# Patient Record
Sex: FEMALE | Race: BLACK OR AFRICAN AMERICAN | Employment: FULL TIME | URBAN - METROPOLITAN AREA
[De-identification: names, ages, dates, MRNs, and addresses within clinical notes are randomized per-mention and may not be internally consistent; named-entity substitution may affect disease eponyms.]

---

## 2019-04-30 ENCOUNTER — ANESTHESIA EVENT (OUTPATIENT)
Dept: SURGERY | Age: 59
End: 2019-04-30
Payer: COMMERCIAL

## 2019-04-30 RX ORDER — LOSARTAN POTASSIUM AND HYDROCHLOROTHIAZIDE 12.5; 1 MG/1; MG/1
1 TABLET ORAL DAILY
COMMUNITY

## 2019-04-30 RX ORDER — OXYMETAZOLINE HCL 0.05 %
2 SPRAY, NON-AEROSOL (ML) NASAL
COMMUNITY

## 2019-04-30 RX ORDER — LORATADINE 10 MG/1
10 TABLET ORAL
COMMUNITY

## 2019-04-30 NOTE — PERIOP NOTES
PAT - SURGICAL PRE-ADMISSION INSTRUCTIONS 
 
NAME:  Izabel Alvarado                                                          TODAY'S DATE:  4/30/2019 SURGERY DATE:  5/1/2019                                  SURGERY ARRIVAL TIME:   0800 1. Do NOT eat or drink anything, including candy or gum, after MIDNIGHT on 04/30 , unless you have specific instructions from your Surgeon or Anesthesia Provider to do so. 2. No smoking on the day of surgery. 3. No alcohol 24 hours prior to the day of surgery. 4. No recreational drugs for one week prior to the day of surgery. 5. Leave all valuables, including money/purse, at home. 6. Remove all jewelry, nail polish, makeup (including mascara); no lotions, powders, deodorant, or perfume/cologne/after shave. 7. Glasses/Contact lenses and Dentures may be worn to the hospital.  They will be removed prior to surgery. 8. Call your doctor if symptoms of a cold or illness develop within 24 ours prior to surgery. 9. AN ADULT MUST DRIVE YOU HOME AFTER OUTPATIENT SURGERY. 10. If you are having an OUTPATIENT procedure, please make arrangements for a responsible adult to be with you for 24 hours after your surgery. 11. If you are admitted to the hospital, you will be assigned to a bed after surgery is complete. Normally a family member will not be able to see you until you are in your assigned bed. 15. Family is encouraged to accompany you to the hospital.  We ask visitors in the treatment area to be limited to ONE person at a time to ensure patient privacy. EXCEPTIONS WILL BE MADE AS NEEDED. [address] [facility] [place]. Children under 12 are discouraged from entering the treatment area and need to be supervised by an adult when in the waiting room. Special Instructions: 
 
NONE. Patient Prep: 
 
shower with anti-bacterial soap These surgical instructions were reviewed with Janny during the PAT phone call. Directions:   On the morning of surgery, please go to the [facility]. Enter the building from the [facility] entrance, 1st floor (next to the Emergency Room entrance). Take the elevator to the 2nd floor. Sign in at the Registration Desk. If you have any questions and/or concerns, please do not hesitate to call: 
(Prior to the day of surgery)  \[facility]\"" unit:  [phone] (Day of surgery)   unit:  [phone]

## 2019-05-01 ENCOUNTER — HOSPITAL ENCOUNTER (OUTPATIENT)
Age: 59
Setting detail: OUTPATIENT SURGERY
Discharge: HOME | End: 2019-05-01
Attending: PLASTIC SURGERY | Admitting: PLASTIC SURGERY
Payer: COMMERCIAL

## 2019-05-01 ENCOUNTER — ANESTHESIA (OUTPATIENT)
Dept: SURGERY | Age: 59
End: 2019-05-01
Payer: COMMERCIAL

## 2019-05-01 VITALS
RESPIRATION RATE: 16 BRPM | TEMPERATURE: 97.6 F | DIASTOLIC BLOOD PRESSURE: 85 MMHG | OXYGEN SATURATION: 98 % | BODY MASS INDEX: 36.82 KG/M2 | WEIGHT: 195 LBS | SYSTOLIC BLOOD PRESSURE: 148 MMHG | HEIGHT: 61 IN | HEART RATE: 72 BPM

## 2019-05-01 DIAGNOSIS — Z98.890 POST-OPERATIVE STATE: ICD-10-CM

## 2019-05-01 DIAGNOSIS — G56.01 RIGHT CARPAL TUNNEL SYNDROME: Primary | ICD-10-CM

## 2019-05-01 LAB
ATRIAL RATE: 67 BPM
CALCULATED P AXIS, ECG09: -13 DEGREES
CALCULATED R AXIS, ECG10: 42 DEGREES
CALCULATED T AXIS, ECG11: -15 DEGREES
DIAGNOSIS, 93000: NORMAL
P-R INTERVAL, ECG05: 142 MS
Q-T INTERVAL, ECG07: 392 MS
QRS DURATION, ECG06: 78 MS
QTC CALCULATION (BEZET), ECG08: 414 MS
VENTRICULAR RATE, ECG03: 67 BPM

## 2019-05-01 PROCEDURE — 77030018836 HC SOL IRR NACL ICUM -A: Performed by: PLASTIC SURGERY

## 2019-05-01 PROCEDURE — 74011000250 HC RX REV CODE- 250: Performed by: PLASTIC SURGERY

## 2019-05-01 PROCEDURE — 74011250636 HC RX REV CODE- 250/636

## 2019-05-01 PROCEDURE — 77030020753 HC CUF TRNQT 1BLA STRY -B: Performed by: PLASTIC SURGERY

## 2019-05-01 PROCEDURE — 74011250637 HC RX REV CODE- 250/637: Performed by: NURSE ANESTHETIST, CERTIFIED REGISTERED

## 2019-05-01 PROCEDURE — 76010000138 HC OR TIME 0.5 TO 1 HR: Performed by: PLASTIC SURGERY

## 2019-05-01 PROCEDURE — 77030002986 HC SUT PROL J&J -A: Performed by: PLASTIC SURGERY

## 2019-05-01 PROCEDURE — 93005 ELECTROCARDIOGRAM TRACING: CPT

## 2019-05-01 PROCEDURE — 76210000020 HC REC RM PH II FIRST 0.5 HR: Performed by: PLASTIC SURGERY

## 2019-05-01 PROCEDURE — 77030013079 HC BLNKT BAIR HGGR 3M -A: Performed by: ANESTHESIOLOGY

## 2019-05-01 PROCEDURE — 74011250636 HC RX REV CODE- 250/636: Performed by: PLASTIC SURGERY

## 2019-05-01 PROCEDURE — 77030032490 HC SLV COMPR SCD KNE COVD -B: Performed by: PLASTIC SURGERY

## 2019-05-01 PROCEDURE — 74011250636 HC RX REV CODE- 250/636: Performed by: NURSE ANESTHETIST, CERTIFIED REGISTERED

## 2019-05-01 PROCEDURE — 76060000032 HC ANESTHESIA 0.5 TO 1 HR: Performed by: PLASTIC SURGERY

## 2019-05-01 PROCEDURE — 74011000272 HC RX REV CODE- 272: Performed by: PLASTIC SURGERY

## 2019-05-01 RX ORDER — LIDOCAINE HYDROCHLORIDE 10 MG/ML
0.1 INJECTION, SOLUTION EPIDURAL; INFILTRATION; INTRACAUDAL; PERINEURAL AS NEEDED
Status: DISCONTINUED | OUTPATIENT
Start: 2019-05-01 | End: 2019-05-01 | Stop reason: HOSPADM

## 2019-05-01 RX ORDER — INSULIN LISPRO 100 [IU]/ML
INJECTION, SOLUTION INTRAVENOUS; SUBCUTANEOUS ONCE
Status: CANCELLED | OUTPATIENT
Start: 2019-05-01 | End: 2019-05-01

## 2019-05-01 RX ORDER — MIDAZOLAM HYDROCHLORIDE 1 MG/ML
INJECTION, SOLUTION INTRAMUSCULAR; INTRAVENOUS AS NEEDED
Status: DISCONTINUED | OUTPATIENT
Start: 2019-05-01 | End: 2019-05-01 | Stop reason: HOSPADM

## 2019-05-01 RX ORDER — PROPOFOL 10 MG/ML
INJECTION, EMULSION INTRAVENOUS AS NEEDED
Status: DISCONTINUED | OUTPATIENT
Start: 2019-05-01 | End: 2019-05-01 | Stop reason: HOSPADM

## 2019-05-01 RX ORDER — SODIUM CHLORIDE, SODIUM LACTATE, POTASSIUM CHLORIDE, CALCIUM CHLORIDE 600; 310; 30; 20 MG/100ML; MG/100ML; MG/100ML; MG/100ML
50 INJECTION, SOLUTION INTRAVENOUS CONTINUOUS
Status: DISCONTINUED | OUTPATIENT
Start: 2019-05-01 | End: 2019-05-01 | Stop reason: HOSPADM

## 2019-05-01 RX ORDER — HYDROCODONE BITARTRATE AND ACETAMINOPHEN 5; 325 MG/1; MG/1
1 TABLET ORAL
Qty: 5 TAB | Refills: 0 | Status: SHIPPED | OUTPATIENT
Start: 2019-05-01 | End: 2019-05-08

## 2019-05-01 RX ORDER — IBUPROFEN 600 MG/1
TABLET ORAL
Qty: 20 TAB | Refills: 0 | Status: SHIPPED | OUTPATIENT
Start: 2019-05-01

## 2019-05-01 RX ORDER — MAGNESIUM SULFATE 100 %
4 CRYSTALS MISCELLANEOUS AS NEEDED
Status: CANCELLED | OUTPATIENT
Start: 2019-05-01

## 2019-05-01 RX ORDER — PROPOFOL 10 MG/ML
INJECTION, EMULSION INTRAVENOUS
Status: DISCONTINUED | OUTPATIENT
Start: 2019-05-01 | End: 2019-05-01 | Stop reason: HOSPADM

## 2019-05-01 RX ORDER — FENTANYL CITRATE 50 UG/ML
INJECTION, SOLUTION INTRAMUSCULAR; INTRAVENOUS AS NEEDED
Status: DISCONTINUED | OUTPATIENT
Start: 2019-05-01 | End: 2019-05-01 | Stop reason: HOSPADM

## 2019-05-01 RX ORDER — DIPHENHYDRAMINE HYDROCHLORIDE 50 MG/ML
12.5 INJECTION, SOLUTION INTRAMUSCULAR; INTRAVENOUS
Status: CANCELLED | OUTPATIENT
Start: 2019-05-01

## 2019-05-01 RX ORDER — SODIUM CHLORIDE 0.9 % (FLUSH) 0.9 %
5-40 SYRINGE (ML) INJECTION AS NEEDED
Status: CANCELLED | OUTPATIENT
Start: 2019-05-01

## 2019-05-01 RX ORDER — SODIUM CHLORIDE, SODIUM LACTATE, POTASSIUM CHLORIDE, CALCIUM CHLORIDE 600; 310; 30; 20 MG/100ML; MG/100ML; MG/100ML; MG/100ML
100 INJECTION, SOLUTION INTRAVENOUS CONTINUOUS
Status: CANCELLED | OUTPATIENT
Start: 2019-05-01

## 2019-05-01 RX ORDER — SODIUM CHLORIDE 0.9 % (FLUSH) 0.9 %
5-40 SYRINGE (ML) INJECTION EVERY 8 HOURS
Status: CANCELLED | OUTPATIENT
Start: 2019-05-01

## 2019-05-01 RX ORDER — FAMOTIDINE 20 MG/1
20 TABLET, FILM COATED ORAL ONCE
Status: COMPLETED | OUTPATIENT
Start: 2019-05-01 | End: 2019-05-01

## 2019-05-01 RX ORDER — CEFAZOLIN SODIUM 2 G/50ML
2 SOLUTION INTRAVENOUS
Status: COMPLETED | OUTPATIENT
Start: 2019-05-01 | End: 2019-05-01

## 2019-05-01 RX ORDER — DEXTROSE 50 % IN WATER (D50W) INTRAVENOUS SYRINGE
25-50 AS NEEDED
Status: CANCELLED | OUTPATIENT
Start: 2019-05-01

## 2019-05-01 RX ORDER — OXYCODONE AND ACETAMINOPHEN 5; 325 MG/1; MG/1
1 TABLET ORAL AS NEEDED
Status: CANCELLED | OUTPATIENT
Start: 2019-05-01

## 2019-05-01 RX ADMIN — SODIUM CHLORIDE, SODIUM LACTATE, POTASSIUM CHLORIDE, AND CALCIUM CHLORIDE 50 ML/HR: 600; 310; 30; 20 INJECTION, SOLUTION INTRAVENOUS at 09:08

## 2019-05-01 RX ADMIN — CEFAZOLIN SODIUM 2 G: 2 SOLUTION INTRAVENOUS at 10:26

## 2019-05-01 RX ADMIN — MIDAZOLAM HYDROCHLORIDE 2 MG: 1 INJECTION, SOLUTION INTRAMUSCULAR; INTRAVENOUS at 10:12

## 2019-05-01 RX ADMIN — FENTANYL CITRATE 50 MCG: 50 INJECTION, SOLUTION INTRAMUSCULAR; INTRAVENOUS at 10:41

## 2019-05-01 RX ADMIN — PROPOFOL 40 MG: 10 INJECTION, EMULSION INTRAVENOUS at 10:18

## 2019-05-01 RX ADMIN — PROPOFOL 25 MCG/KG/MIN: 10 INJECTION, EMULSION INTRAVENOUS at 10:14

## 2019-05-01 RX ADMIN — FAMOTIDINE 20 MG: 20 TABLET, FILM COATED ORAL at 08:40

## 2019-05-01 NOTE — ANESTHESIA POSTPROCEDURE EVALUATION
Procedure(s): 
right wrist open  CARPAL TUNNEL RELEASE with local. 
 
MAC Anesthesia Post Evaluation Multimodal analgesia: multimodal analgesia used between 6 hours prior to anesthesia start to PACU discharge Patient location during evaluation: bedside Patient participation: complete - patient participated Level of consciousness: awake Pain management: adequate Airway patency: patent Anesthetic complications: no 
Cardiovascular status: stable Respiratory status: acceptable Hydration status: acceptable Post anesthesia nausea and vomiting:  controlled No vitals data found for the desired time range.

## 2019-05-01 NOTE — PERIOP NOTES
Pre-Op Summary Pt arrived via car with family/friend and is oriented to time, place, person and situation. Patient with steady gait with none assistive devices. Visit Vitals BP (!) 140/94 (BP 1 Location: Left arm, BP Patient Position: At rest) Pulse 68 Temp 97.6 °F (36.4 °C) Resp 16 Ht 5' 1\" (1.549 m) Wt 88.5 kg (195 lb) SpO2 100% BMI 36.84 kg/m² Peripheral IV located on Left antecubital . Patients belongings are located given to sister. Patient's point of contact is sister Dobbs and their contact number [phone]. They will be in the waiting room. They are able to receive medication information. They will be their ride home.

## 2019-05-01 NOTE — DISCHARGE INSTRUCTIONS
Patient Education      Carpal Tunnel Release: What to Expect at Home  Your Recovery  Your hand will hurt and may feel weak with some numbness. This usually goes away in a few days, but it may take several months. Your doctor may remove the large bandage, or he or she will tell you when and how to remove it yourself. In some cases, you may have a splint. If you have one, you will wear it for about 2 weeks. Your doctor will take out your stitches in 1 to 2 weeks. Your hand and wrist may feel worse than they had felt. But the pain should begin to go away. It usually takes 3 to 4 months to recover and up to 1 year before hand strength returns. How much hand strength returns will vary. The timing of your return to work depends on the type of surgery you had, whether the surgery was on your dominant hand (the hand you use most), and your work activities. If you had open surgery on your dominant hand and you do repeated actions at work, you may be able to return to work in 10 to 8 weeks. Repeated motions include typing or assembly-line work. If the surgery was on the other hand and you do not do repeated actions at work, you may be able to return to work in 9 to 14 days. If you had endoscopic surgery, you may be able to return to work sooner than with open surgery. This care sheet gives you a general idea about how long it will take for you to recover. But each person recovers at a different pace. Follow the steps below to get better as quickly as possible. How can you care for yourself at home? Activity    · Rest when you feel tired. Getting enough sleep will help you recover.     · Try to walk each day. Start by walking a little more than you did the day before. Bit by bit, increase the amount you walk.     · For up to 2 weeks after surgery, avoid lifting things heavier than 1 to 2 pounds and using your hand.  This includes doing repeated arm or hand movements, such as typing or using a computer mouse, washing windows, vacuuming, or chopping food. Do not use power tools, and avoid activities that cause vibration.     · You may begin heavier tasks about 4 weeks after surgery. These include vacuuming, mowing the lawn, and gardening.     · You may shower 24 to 48 hours after surgery, if your doctor okays it. Keep your bandage dry by taping a sheet of plastic to cover it. If you have a splint, keep it dry. Your doctor will tell you whether you can remove it when you shower. Be careful not to put the splint on too tight. Do not take a bath until the incision heals, or until your doctor tells you it is okay.     · You may drive when you are fully able to use your hand. Diet    · You can eat your normal diet. If your stomach is upset, try bland, low-fat foods like plain rice, broiled chicken, toast, and yogurt. Medicines    · Your doctor will tell you if and when you can restart your medicines. He or she will also give you instructions about taking any new medicines.     · If you take blood thinners, such as warfarin (Coumadin), clopidogrel (Plavix), or aspirin, be sure to talk to your doctor. He or she will tell you if and when to start taking those medicines again. Make sure that you understand exactly what your doctor wants you to do.     · Take pain medicines exactly as directed. ? If the doctor gave you a prescription medicine for pain, take it as prescribed. ? If you are not taking a prescription pain medicine, take an over-the-counter medicine such as acetaminophen (Tylenol), ibuprofen (Advil, Motrin), or naproxen (Aleve). Read and follow all instructions on the label. ? Do not take two or more pain medicines at the same time unless the doctor told you to. Many pain medicines have acetaminophen, which is Tylenol. Too much acetaminophen (Tylenol) can be harmful.     · If you think your pain medicine is making you sick to your stomach:  ? Take your medicine after meals (unless your doctor has told you not to). ?  Ask your doctor for a different pain medicine.     · If your doctor prescribed antibiotics, take them as directed. Do not stop taking them just because you feel better. You need to take the full course of antibiotics.    Incision and splint care    · Keep your bandage dry. If it gets dirty, you may change it.     · If you have a splint, talk to your doctor about when you should wear it. Exercise    · You may need wrist and hand rehabilitation. This is a series of exercises you do after your surgery. This helps you get back your wrist's and hand's range of motion, strength, and . You will work with your doctor and physical or occupational therapist to plan this exercise program. To get the best results, you need to do the exercises correctly and as often and as long as your doctor tells you. Ice and elevation    · Put ice or a cold pack on your wrist for 10 to 20 minutes at a time. Try to do this every 1 to 2 hours for the next 3 days (when you are awake) or until the swelling goes down. Put a thin cloth between the ice and your skin.     · Prop up the sore wrist on a pillow when you ice it or anytime you sit or lie down during the next 3 days. Try to keep it above the level of your heart. This will help reduce swelling. Other instructions    · Avoid letting your hand hang down. This can cause swelling. Follow-up care is a key part of your treatment and safety. Be sure to make and go to all appointments, and call your doctor if you are having problems. It's also a good idea to know your test results and keep a list of the medicines you take. When should you call for help? Call 911 anytime you think you may need emergency care.  For example, call if:    · You passed out (lost consciousness).     · You have chest pain, are short of breath, or cough up blood.    Call your doctor now or seek immediate medical care if:    · You have pain that does not get better after you take pain medicine.     · Your hand is cool or pale or changes color.     · Your cast or splint feels too tight.     · You have tingling, weakness, or numbness in your hand or fingers.     · You are sick to your stomach or cannot drink fluids.     · You have loose stitches, or your incision comes open.     · You have signs of a blood clot in your leg (called a deep vein thrombosis), such as:  ? Pain in your calf, back of the knee, thigh, or groin. ? Redness or swelling in your leg.     · You have signs of infection, such as:  ? Increased pain, swelling, warmth, or redness. ? Red streaks leading from the incision. ? Pus draining from the incision. ? A fever.     · Bright red blood has soaked through the bandage over your incision.    Watch closely for any changes in your health, and be sure to contact your doctor if:    · You have a problem with your cast or splint.     · You do not get better as expected. Where can you learn more? Go to http://lexx-pepito.info/. Enter N388 in the search box to learn more about \"Carpal Tunnel Release: What to Expect at Home. \"  Current as of: September 20, 2018  Content Version: 11.9  © 6149-7732 [employer]. Care instructions adapted under license by [employer] (which disclaims liability or warranty for this information). If you have questions about a medical condition or this instruction, always ask your healthcare professional. [place] [address] any warranty or liability for your use of this information. DISCHARGE SUMMARY from Nurse    PATIENT INSTRUCTIONS:    After general anesthesia or intravenous sedation, for 24 hours or while taking prescription Narcotics:  · Limit your activities  · Do not drive and operate hazardous machinery  · Do not make important personal or business decisions  · Do  not drink alcoholic beverages  · If you have not urinated within 8 hours after discharge, please contact your surgeon on call.     Report the following to your surgeon:  · Excessive pain, swelling, redness or odor of or around the surgical area  · Temperature over 100.5  · Nausea and vomiting lasting longer than 4 hours or if unable to take medications  · Any signs of decreased circulation or nerve impairment to extremity: change in color, persistent  numbness, tingling, coldness or increase pain  · Any questions    What to do at Home:  Recommended activity: Activity as tolerated and no driving for today    These are general instructions for a healthy lifestyle:    No smoking/ No tobacco products/ Avoid exposure to second hand smoke  Surgeon General's Warning:  Quitting smoking now greatly reduces serious risk to your health. Obesity, smoking, and sedentary lifestyle greatly increases your risk for illness    A healthy diet, regular physical exercise & weight monitoring are important for maintaining a healthy lifestyle    You may be retaining fluid if you have a history of heart failure or if you experience any of the following symptoms:  Weight gain of 3 pounds or more overnight or 5 pounds in a week, increased swelling in our hands or feet or shortness of breath while lying flat in bed. Please call your doctor as soon as you notice any of these symptoms; do not wait until your next office visit. Recognize signs and symptoms of STROKE:    F-face looks uneven    A-arms unable to move or move unevenly    S-speech slurred or non-existent    T-time-call 911 as soon as signs and symptoms begin-DO NOT go       Back to bed or wait to see if you get better-TIME IS BRAIN. Warning Signs of HEART ATTACK     Call 911 if you have these symptoms:   Chest discomfort. Most heart attacks involve discomfort in the center of the chest that lasts more than a few minutes, or that goes away and comes back. It can feel like uncomfortable pressure, squeezing, fullness, or pain.  Discomfort in other areas of the upper body.  Symptoms can include pain or discomfort in one or both arms, the back, neck, jaw, or stomach.  Shortness of breath with or without chest discomfort.  Other signs may include breaking out in a cold sweat, nausea, or lightheadedness. Don't wait more than five minutes to call 911 - MINUTES MATTER! Fast action can save your life. Calling 911 is almost always the fastest way to get lifesaving treatment. Emergency Medical Services staff can begin treatment when they arrive -- up to an hour sooner than if someone gets to the hospital by car. The discharge information has been reviewed with the patient. The patient verbalized understanding. Discharge medications reviewed with the patient and appropriate educational materials and side effects teaching were provided. Patient armband removed and given to patient to take home. Patient was informed of the privacy risks if armband lost or stolen.

## 2019-05-01 NOTE — ANESTHESIA PREPROCEDURE EVALUATION
Relevant Problems No relevant active problems Anesthetic History No history of anesthetic complications Review of Systems / Medical History Patient summary reviewed and pertinent labs reviewed Pulmonary Asthma : well controlled Neuro/Psych Within defined limits Cardiovascular Hypertension Dysrhythmias : PVC 
 
 
 
  
GI/Hepatic/Renal 
Within defined limits Endo/Other Within defined limits Other Findings Physical Exam 
 
Airway Mallampati: II 
TM Distance: 4 - 6 cm Neck ROM: normal range of motion Mouth opening: Normal 
 
 Cardiovascular Dental 
No notable dental hx Pulmonary Abdominal 
GI exam deferred Other Findings Anesthetic Plan ASA: 3 Anesthesia type: MAC Anesthetic plan and risks discussed with: Patient

## 2019-05-01 NOTE — INTERVAL H&P NOTE
H&P Update: 
Polly Blancas was seen and examined. History and physical has been reviewed. The patient has been examined. There have been no significant clinical changes since the completion of the originally dated History and Physical. 
Discussed Right Carpal Tunnel Release. All Questions answered.

## 2019-05-01 NOTE — BRIEF OP NOTE
BRIEF OPERATIVE NOTE 
 
BRIEF OPERATIVE NOTE Patient: Yasmeen Huerta MRN: [id number]  CSN: [id number] YOB: 1960  Age: 61 y.o. Sex: female Date of Procedure: 5/1/2019 Preoperative Diagnosis: right wrist carpal tunnel syndrome g56.01 Postoperative Diagnosis: right wrist carpal tunnel syndrome g56.01 Procedure: Procedure(s): 
right wrist open  CARPAL TUNNEL RELEASE with local 
  
Surgeon: Bubba Mendoza MD  
  
First Assistant:   RJ Anesthesia Staff: Anesthesiologist: Ladarius Johnston MD 
CRNA: Nara Mays CRNA Anesthesia: MAC Local Used:  7 cc 1 % lidocaine and 0.5 % marcaine  50:50 mix Fluid:  400 cc crystalloid Tourniquet Time: Total Tourniquet Time Documented: 
Upper Arm (Right) - 14 minutes Total: Upper Arm (Right) - 14 minutes 
 @  225 mm Hg. Estimated Blood Loss: < 10 cc Specimens: * No specimens in log * Implants: * No implants in log * Findings: Flattened median nerve with synovitis Complications: None; patient tolerated the procedure well. Bubba Mendoza MD 
5/1/2019 10:57 AM 
 
Dictation Number: #Completed

## 2019-05-01 NOTE — PERIOP NOTES
Phase 2 Recovery Summary Patient arrived to Phase 2 at 56 Report received from Dave Payne RN Vitals:  
 04/30/19 0914 05/01/19 0836 05/01/19 1059 BP:  (!) 140/94 148/85 Pulse:  68 72 Resp:  16 16 Temp:  97.6 °F (36.4 °C) SpO2:  100% 98% Weight: 88 kg (194 lb) 88.5 kg (195 lb) Height: 5' 1\" (1.549 m)    
 
 
oriented to time, place, person and situation Lines and Drains Peripheral Intravenous Line:  
Peripheral IV 05/01/19 Left Antecubital (Active) Site Assessment Clean, dry, & intact 5/1/2019 11:02 AM  
Phlebitis Assessment 0 5/1/2019 11:02 AM  
Infiltration Assessment 0 5/1/2019 11:02 AM  
Dressing Status Clean, dry, & intact 5/1/2019 11:02 AM  
Dressing Type Tape;Transparent 5/1/2019 11:02 AM  
Hub Color/Line Status Blue; Infusing 5/1/2019 11:02 AM  
Alcohol Cap Used Yes 5/1/2019  9:07 AM  
 
 
Wound Wound Wrist Right (Active) Dressing Status Clean, dry, and intact 5/1/2019 11:02 AM  
Dressing Type 4 x 4;Sutures; Xeroform 5/1/2019 11:02 AM  
Incision Site Well Approximated Yes 5/1/2019 10:53 AM  
Number of days: 0 Pt arrived to phase 2 from PACU. A&Ox4. Pt denied pain N/V or dizziness. VSS. Pt ambulated from bed to chair with minimal assistance. IV removed. Pt allowed to get dressed. Discharge instructions reviewed with pt and son. Son signed for discharge. Pt brought down to vehicle via wheelchair. Patient discharged to home with Edmond Denson  at [facility]

## 2022-12-20 NOTE — OP NOTES
[facility]  OPERATIVE REPORT    Name:  Yudelka Remy  MR#:   [id number]  :  1960  ACCOUNT #:  [de-identified]  DATE OF SERVICE:  2019    PREOPERATIVE DIAGNOSIS:  Right carpal tunnel syndrome refractory to conservative management. POSTOPERATIVE DIAGNOSIS:  Right carpal tunnel syndrome refractory to conservative management. PROCEDURE PERFORMED:  Right wrist open carpal tunnel release. SURGEON:  Miller Fowler. MD [place]    ASSISTANT:  SA    ANESTHESIA:  Local (1% lidocaine plain:0.5% Marcaine plain infiltrated locally) and MAC. FLUIDS ADMINISTERED:  400 mL of crystalloid. COMPLICATIONS:  None. SPECIMENS REMOVED:  none. IMPLANTS:  None. ESTIMATED BLOOD LOSS:  Less than 10 mL    TOURNIQUET TIME:  14 minutes at 225 mmHg. FINDINGS:  Synovitis as well as flattened and narrowed median nerve within the carpal tunnel. INDICATIONS:  The patient is a 51-year-old female with a history of numbness and tingling in the right fingers consistent with carpal tunnel syndrome. She had previously undergone conservative management; however, her numbness and tingling continued. EMG/nerve conduction studies confirmed carpal tunnel syndrome. She is being brought to the operating room for open carpal tunnel release. PROCEDURE:  After proper informed consent was obtained, she was brought to the operating room in [facility] and the right upper extremity was extended on an armboard. Once sedation was achieved by Anesthesia, surgical site was cleansed with isopropyl alcohol and infiltrated with local anesthetic. The right upper extremity was then prepped and draped for a sterile field using ChloraPrep solution after a well-padded tourniquet was applied to the arm. Procedure was begun by designing an open carpal tunnel incision at the volar wrist region and elevating the arm for gravity exsanguination while the brachial artery was compressed. Tourniquet was inflated to 225 mmHg. Incision was made and dissection was carried through the skin, through the subcutaneous tissue, through the palmar fascia. Flexor retinaculum was identified and using a new 15-blade scalpel, under loupe magnification, the flexor retinaculum was divided in a longitudinal fashion. The median nerve was identified and preserved throughout the procedure. Full release of the carpal tunnel was accomplished from the fat pad surrounding the superficial palmar arch to the brachial fascia. Exploration of the carpal tunnel revealed no space-occupying lesions, flattening of the median nerve, and synovitis. Wounds were copiously irrigated using normal saline and tourniquet was deflated thus restoring blood flow to the hand. Once hemostasis was assured, closure of the incision was accomplished using 4-0 Prolene in a horizontal mattress fashion. Sterile dressings were applied consisting of Xeroform, 4 x 4's, and Josh wrap. She was awakened from sedation and taken to phase 2 recovery in stable condition. There were no complications. The patient tolerated the procedure well. All counts were correct at the conclusion of the procedure. Dr. Analy Huff performed the entire procedure.       Jaycee White MD      [id number]  D:  05/01/2019 15:53  T:  05/01/2019 22:12  JOB #:  [id number] Monthly or less

## (undated) DEVICE — KENDALL SCD EXPRESS SLEEVES, KNEE LENGTH, MEDIUM: Brand: KENDALL SCD

## (undated) DEVICE — SUTURE NONABSORBABLE MONOFILAMENT 4-0 PS-2 18 IN BLU PROLENE 8682H

## (undated) DEVICE — DRAPE,HAND,STERILE: Brand: MEDLINE

## (undated) DEVICE — SOLUTION IV 1000ML 0.9% SOD CHL

## (undated) DEVICE — PREP SKN CHLRAPRP 26ML TNT -- CONVERT TO ITEM 373320

## (undated) DEVICE — CURITY STRETCH BANDAGE: Brand: CURITY

## (undated) DEVICE — GAUZE SPONGES,12 PLY: Brand: CURITY

## (undated) DEVICE — OCCLUSIVE GAUZE STRIP,3% BISMUTH TRIBROMOPHENATE IN PETROLATUM BLEND: Brand: XEROFORM

## (undated) DEVICE — SYR 10ML CTRL LR LCK NSAF LF --

## (undated) DEVICE — KIT PROC EXTRM HND FT CUST LF --

## (undated) DEVICE — STERILE LATEX POWDER-FREE SURGICAL GLOVESWITH NITRILE COATING: Brand: PROTEXIS

## (undated) DEVICE — INTENDED FOR TISSUE SEPARATION, AND OTHER PROCEDURES THAT REQUIRE A SHARP SURGICAL BLADE TO PUNCTURE OR CUT.: Brand: BARD-PARKER ® CARBON RIB-BACK BLADES

## (undated) DEVICE — DISPOSABLE TOURNIQUET CUFF SINGLE BLADDER, SINGLE PORT AND QUICK CONNECT CONNECTOR: Brand: COLOR CUFF

## (undated) DEVICE — NEEDLE HYPO 25GA L1.5IN BVL ORIENTED ECLIPSE

## (undated) DEVICE — 1010 S-DRAPE TOWEL DRAPE 10/BX: Brand: STERI-DRAPE™